# Patient Record
Sex: MALE | URBAN - METROPOLITAN AREA
[De-identification: names, ages, dates, MRNs, and addresses within clinical notes are randomized per-mention and may not be internally consistent; named-entity substitution may affect disease eponyms.]

---

## 2022-08-08 ENCOUNTER — OFFICE VISIT (OUTPATIENT)
Dept: URBAN - METROPOLITAN AREA CLINIC 74 | Facility: CLINIC | Age: 32
End: 2022-08-08

## 2025-02-03 ENCOUNTER — OFFICE VISIT (OUTPATIENT)
Dept: URBAN - METROPOLITAN AREA CLINIC 2 | Facility: CLINIC | Age: 35
End: 2025-02-03

## 2025-02-10 ENCOUNTER — OFFICE VISIT (OUTPATIENT)
Dept: URBAN - METROPOLITAN AREA CLINIC 2 | Facility: CLINIC | Age: 35
End: 2025-02-10
Payer: COMMERCIAL

## 2025-02-10 ENCOUNTER — DASHBOARD ENCOUNTERS (OUTPATIENT)
Age: 35
End: 2025-02-10

## 2025-02-10 VITALS
SYSTOLIC BLOOD PRESSURE: 116 MMHG | WEIGHT: 190.2 LBS | DIASTOLIC BLOOD PRESSURE: 85 MMHG | HEART RATE: 87 BPM | BODY MASS INDEX: 25.76 KG/M2 | TEMPERATURE: 98.1 F | HEIGHT: 72 IN

## 2025-02-10 DIAGNOSIS — K51.00 ULCERATIVE PANCOLITIS: ICD-10-CM

## 2025-02-10 PROBLEM — 444548001: Status: ACTIVE | Noted: 2025-02-10

## 2025-02-10 PROCEDURE — 99203 OFFICE O/P NEW LOW 30 MIN: CPT | Performed by: NURSE PRACTITIONER

## 2025-02-10 RX ORDER — MESALAMINE 1000 MG/1
1 SUPPOSITORY AT BEDTIME SUPPOSITORY RECTAL ONCE A DAY
Status: ACTIVE | COMMUNITY

## 2025-02-10 RX ORDER — MESALAMINE 1.2 G/1
2 TABLETS WITH A MEAL TABLET, DELAYED RELEASE ORAL ONCE A DAY
Status: ACTIVE | COMMUNITY

## 2025-02-10 NOTE — HPI-TODAY'S VISIT:
34-year-old male seen today to establish care for history of ulcerative colitis.  Patient was diagnosed in 2022 with pancolitis.  He was treated initially with mesalamine oral and suppositories.  Mesalamine has been cost prohibitive so once his symptoms improved, he decreased his mesalamine to 2.4 g daily in April. Patient had a flare in July with increased rectal bleeding.  He went to ER and CT showed inflammation on the right colon.  Since then he is taking mesalamine 4.8gm and mesalamine suppositories. he denies abdominal pain. He had rectal bleeding over the weekend-prior to that was last July. he denies urgency. some increase in frequency. Father also has UC-does not know what he takes.

## 2025-02-27 LAB
ALBUMIN/GLOBULIN RATIO: 1.4
ALBUMIN: 4.2
ALKALINE PHOSPHATASE: 76
ALT: 42
AST: 26
BILIRUBIN, TOTAL: 0.7
BUN/CREATININE RATIO: (no result)
CALCIUM: 9.4
CARBON DIOXIDE: 26
CHLORIDE: 103
CREATININE: 0.97
GLOBULIN: 3
GLUCOSE: 97
POTASSIUM: 4.4
PROTEIN, TOTAL: 7.2
SODIUM: 138
UREA NITROGEN (BUN): 16

## 2025-02-28 LAB
C-REACTIVE PROTEIN, QUANT: <3
HEMATOCRIT: 46
HEMOGLOBIN: 15.6
MCH: 29.6
MCHC: 33.9
MCV: 87.3
MPV: 10.1
PLATELET COUNT: 198
RDW: 14.4
RED BLOOD CELL COUNT: 5.27
WHITE BLOOD CELL COUNT: 4.6

## 2025-03-10 ENCOUNTER — CLAIMS CREATED FROM THE CLAIM WINDOW (OUTPATIENT)
Dept: URBAN - METROPOLITAN AREA SURGERY CENTER 19 | Facility: SURGERY CENTER | Age: 35
End: 2025-03-10
Payer: COMMERCIAL

## 2025-03-10 ENCOUNTER — CLAIMS CREATED FROM THE CLAIM WINDOW (OUTPATIENT)
Dept: URBAN - METROPOLITAN AREA CLINIC 4 | Facility: CLINIC | Age: 35
End: 2025-03-10
Payer: COMMERCIAL

## 2025-03-10 DIAGNOSIS — Z86.0100 PERSONAL HISTORY OF COLONIC POLYPS: ICD-10-CM

## 2025-03-10 DIAGNOSIS — K31.89 OTHER DISEASES OF STOMACH AND DUODENUM: ICD-10-CM

## 2025-03-10 DIAGNOSIS — K51.00 ULCERATIVE PANCOLITIS: ICD-10-CM

## 2025-03-10 DIAGNOSIS — Z09 ENCNTR FOR F/U EXAM AFT TRTMT FOR COND OTH THAN MALIG NEOPLM: ICD-10-CM

## 2025-03-10 DIAGNOSIS — K63.89 OTHER SPECIFIED DISEASES OF INTESTINE: ICD-10-CM

## 2025-03-10 DIAGNOSIS — Z86.0100 PERSONAL HISTORY OF COLON POLYPS, UNSPECIFIED: ICD-10-CM

## 2025-03-10 DIAGNOSIS — K51.80 OTHER ULCERATIVE COLITIS WITHOUT COMPLICATIONS: ICD-10-CM

## 2025-03-10 DIAGNOSIS — K51.80 OTHER ULCERATIVE COLITIS: ICD-10-CM

## 2025-03-10 PROCEDURE — 00812 ANES LWR INTST SCR COLSC: CPT | Performed by: ANESTHESIOLOGY

## 2025-03-10 PROCEDURE — 45380 COLONOSCOPY AND BIOPSY: CPT | Performed by: INTERNAL MEDICINE

## 2025-03-10 PROCEDURE — 88305 TISSUE EXAM BY PATHOLOGIST: CPT | Performed by: PATHOLOGY

## 2025-03-10 RX ORDER — MESALAMINE 1000 MG/1
1 SUPPOSITORY AT BEDTIME SUPPOSITORY RECTAL ONCE A DAY
Status: ACTIVE | COMMUNITY

## 2025-03-10 RX ORDER — MESALAMINE 1.2 G/1
2 TABLETS WITH A MEAL TABLET, DELAYED RELEASE ORAL ONCE A DAY
Status: ACTIVE | COMMUNITY

## 2025-03-31 ENCOUNTER — OFFICE VISIT (OUTPATIENT)
Dept: URBAN - METROPOLITAN AREA CLINIC 2 | Facility: CLINIC | Age: 35
End: 2025-03-31
Payer: COMMERCIAL

## 2025-03-31 DIAGNOSIS — K51.80 OTHER ULCERATIVE COLITIS WITHOUT COMPLICATION: ICD-10-CM

## 2025-03-31 PROBLEM — 64766004: Status: ACTIVE | Noted: 2025-03-31

## 2025-03-31 PROCEDURE — 99213 OFFICE O/P EST LOW 20 MIN: CPT | Performed by: NURSE PRACTITIONER

## 2025-03-31 RX ORDER — MESALAMINE 1000 MG/1
1 SUPPOSITORY AT BEDTIME SUPPOSITORY RECTAL ONCE A DAY
Status: ACTIVE | COMMUNITY

## 2025-03-31 RX ORDER — SULFASALAZINE 500 MG/1
1 TABLET TABLET ORAL TWICE A DAY
Qty: 60 TABLET | Refills: 5 | OUTPATIENT
Start: 2025-03-31 | End: 2025-09-27

## 2025-03-31 RX ORDER — MESALAMINE 1.2 G/1
2 TABLETS WITH A MEAL TABLET, DELAYED RELEASE ORAL ONCE A DAY
Status: ACTIVE | COMMUNITY

## 2025-03-31 RX ORDER — ROSUVASTATIN CALCIUM 20 MG/1
TAKE 1 TABLET BY MOUTH ONE TIME DAILY TABLET ORAL
Qty: 30 UNSPECIFIED | Refills: 0 | Status: ACTIVE | COMMUNITY

## 2025-03-31 NOTE — HPI-TODAY'S VISIT:
34-year-old male seen today in follow-up after a colonoscopy to evaluate ulcerative colitis.  In [facility] he was diagnosed in 2022 with pancolitis.  He had been treated with mesalamine oral and suppositories.  However, this medication was cost prohibitive so he was not taking as recommended and had been having increasing flares. however, he did note that suppository helped symptoms better than oral. He never has abdominal pain. He may have loose stools or occasionally have hematochezia. Colonoscopy was notable for normal mucosa but pathology indicated patchy chronic active colitis consistent with partially treated ulcerative colitis in the right colon. He returns today and feels well.

## 2025-05-22 ENCOUNTER — APPOINTMENT (OUTPATIENT)
Age: 35
Setting detail: DERMATOLOGY
End: 2025-05-22

## 2025-05-22 DIAGNOSIS — D18.0 HEMANGIOMA: ICD-10-CM

## 2025-05-22 DIAGNOSIS — D22 MELANOCYTIC NEVI: ICD-10-CM

## 2025-05-22 DIAGNOSIS — D485 NEOPLASM OF UNCERTAIN BEHAVIOR OF SKIN: ICD-10-CM

## 2025-05-22 DIAGNOSIS — L30.4 ERYTHEMA INTERTRIGO: ICD-10-CM

## 2025-05-22 DIAGNOSIS — L81.4 OTHER MELANIN HYPERPIGMENTATION: ICD-10-CM

## 2025-05-22 PROBLEM — L30.9 DERMATITIS, UNSPECIFIED: Status: ACTIVE | Noted: 2025-05-22

## 2025-05-22 PROBLEM — D22.5 MELANOCYTIC NEVI OF TRUNK: Status: ACTIVE | Noted: 2025-05-22

## 2025-05-22 PROBLEM — D48.5 NEOPLASM OF UNCERTAIN BEHAVIOR OF SKIN: Status: ACTIVE | Noted: 2025-05-22

## 2025-05-22 PROBLEM — D18.01 HEMANGIOMA OF SKIN AND SUBCUTANEOUS TISSUE: Status: ACTIVE | Noted: 2025-05-22

## 2025-05-22 PROCEDURE — ? OBSERVATION

## 2025-05-22 PROCEDURE — 99203 OFFICE O/P NEW LOW 30 MIN: CPT | Mod: 25

## 2025-05-22 PROCEDURE — ? PRESCRIPTION

## 2025-05-22 PROCEDURE — ? COUNSELING

## 2025-05-22 PROCEDURE — 11103 TANGNTL BX SKIN EA SEP/ADDL: CPT

## 2025-05-22 PROCEDURE — ? BIOPSY BY SHAVE METHOD

## 2025-05-22 PROCEDURE — 11102 TANGNTL BX SKIN SINGLE LES: CPT

## 2025-05-22 RX ORDER — CLINDAMYCIN PHOSPHATE 10 MG/ML
LOTION TOPICAL
Qty: 60 | Refills: 3 | Status: ERX | COMMUNITY
Start: 2025-05-22

## 2025-05-22 RX ORDER — NYSTATIN OINTMENT 100000 [USP'U]/G
OINTMENT TOPICAL
Qty: 30 | Refills: 4 | Status: ERX | COMMUNITY
Start: 2025-05-22

## 2025-05-22 RX ADMIN — CLINDAMYCIN PHOSPHATE: 10 LOTION TOPICAL at 00:00

## 2025-05-22 RX ADMIN — NYSTATIN OINTMENT: 100000 OINTMENT TOPICAL at 00:00

## 2025-05-22 ASSESSMENT — LOCATION DETAILED DESCRIPTION DERM
LOCATION DETAILED: LEFT LATERAL INFERIOR CHEST
LOCATION DETAILED: PERIUMBILICAL SKIN
LOCATION DETAILED: LEFT INGUINAL CREASE
LOCATION DETAILED: EPIGASTRIC SKIN
LOCATION DETAILED: RIGHT LATERAL INFERIOR CHEST
LOCATION DETAILED: RIGHT LATERAL BUTTOCK
LOCATION DETAILED: SUPERIOR THORACIC SPINE
LOCATION DETAILED: INFERIOR MID FOREHEAD

## 2025-05-22 ASSESSMENT — LOCATION SIMPLE DESCRIPTION DERM
LOCATION SIMPLE: UPPER BACK
LOCATION SIMPLE: RIGHT BUTTOCK
LOCATION SIMPLE: CHEST
LOCATION SIMPLE: GROIN
LOCATION SIMPLE: INFERIOR FOREHEAD
LOCATION SIMPLE: ABDOMEN

## 2025-05-22 ASSESSMENT — SEVERITY ASSESSMENT: SEVERITY: MILD TO MODERATE

## 2025-05-22 ASSESSMENT — LOCATION ZONE DERM
LOCATION ZONE: FACE
LOCATION ZONE: TRUNK

## 2025-05-22 NOTE — PROCEDURE: BIOPSY BY SHAVE METHOD
Detail Level: Detailed
Depth Of Biopsy: dermis
Was A Bandage Applied: Yes
Size Of Lesion In Cm: 0.5
X Size Of Lesion In Cm: 0
Biopsy Type: H and E
Biopsy Method: Dermablade
Anesthesia Type: 1% lidocaine with epinephrine
Hemostasis: Aluminum Chloride and Thermocautery
Wound Care: Mupirocin
Dressing: bandage
Destruction After The Procedure: No
Type Of Destruction Used: Curettage
Curettage Text: The wound bed was treated with curettage after the biopsy was performed.
Cryotherapy Text: The wound bed was treated with cryotherapy after the biopsy was performed.
Electrodesiccation Text: The wound bed was treated with electrodesiccation after the biopsy was performed.
Electrodesiccation And Curettage Text: The wound bed was treated with electrodesiccation and curettage after the biopsy was performed.
Silver Nitrate Text: The wound bed was treated with silver nitrate after the biopsy was performed.
Lab: [id number]
Lab Facility: [id number]
Medical Necessity Information: It is in your best interest to select a reason for this procedure from the list below. All of these items fulfill various CMS LCD requirements except the new and changing color options.
Consent: Written consent was obtained and risks were reviewed including but not limited to scarring, infection, bleeding, scabbing, incomplete removal, nerve damage and allergy to anesthesia.
Post-Care Instructions: I reviewed with the patient in detail post-care instructions. Patient is to keep the biopsy site dry overnight, and then apply bacitracin twice daily until healed. Patient may apply hydrogen peroxide soaks to remove any crusting.
Notification Instructions: Patient will be notified of biopsy results. However, patient instructed to call the office if not contacted within 2 weeks.
Billing Type: Third-Party Bill
Information: Selecting Yes will display possible errors in your note based on the variables you have selected. This validation is only offered as a suggestion for you. PLEASE NOTE THAT THE VALIDATION TEXT WILL BE REMOVED WHEN YOU FINALIZE YOUR NOTE. IF YOU WANT TO FAX A PRELIMINARY NOTE YOU WILL NEED TO TOGGLE THIS TO 'NO' IF YOU DO NOT WANT IT IN YOUR FAXED NOTE.
Size Of Lesion In Cm: 0.6

## 2025-06-30 ENCOUNTER — OFFICE VISIT (OUTPATIENT)
Dept: URBAN - METROPOLITAN AREA CLINIC 2 | Facility: CLINIC | Age: 35
End: 2025-06-30

## 2025-07-17 ENCOUNTER — OFFICE VISIT (OUTPATIENT)
Dept: URBAN - METROPOLITAN AREA CLINIC 2 | Facility: CLINIC | Age: 35
End: 2025-07-17
Payer: COMMERCIAL

## 2025-07-17 DIAGNOSIS — K64.8 INTERNAL HEMORRHOIDS: ICD-10-CM

## 2025-07-17 DIAGNOSIS — K51.00 ULCERATIVE PANCOLITIS: ICD-10-CM

## 2025-07-17 PROCEDURE — 99214 OFFICE O/P EST MOD 30 MIN: CPT | Performed by: NURSE PRACTITIONER

## 2025-07-17 RX ORDER — ROSUVASTATIN CALCIUM 20 MG/1
TAKE 1 TABLET BY MOUTH ONE TIME DAILY TABLET ORAL
Qty: 30 UNSPECIFIED | Refills: 0 | Status: ACTIVE | COMMUNITY

## 2025-07-17 RX ORDER — SULFASALAZINE 500 MG/1
1 TABLET TABLET ORAL TWICE A DAY
Qty: 60 TABLET | Refills: 5 | Status: ACTIVE | COMMUNITY
Start: 2025-03-31 | End: 2025-09-27

## 2025-07-17 RX ORDER — MESALAMINE 1000 MG/1
1 SUPPOSITORY AT BEDTIME SUPPOSITORY RECTAL ONCE A DAY
Status: ON HOLD | COMMUNITY

## 2025-07-17 RX ORDER — HYDROCORTISONE ACETATE 25 MG/1
1 SUPPOSITORY SUPPOSITORY RECTAL ONCE A DAY
Qty: 14 SUPPOSITORY | Refills: 0 | OUTPATIENT
Start: 2025-07-17 | End: 2025-07-31

## 2025-07-17 RX ORDER — MESALAMINE 1.2 G/1
2 TABLETS WITH A MEAL TABLET, DELAYED RELEASE ORAL ONCE A DAY
Status: DISCONTINUED | COMMUNITY

## 2025-07-17 NOTE — HPI-TODAY'S VISIT:
Very pleasant 34-year-old male with history of ulcerative colitis seen today in interval follow-up.  He had been using mesalamine oral and suppositories.  However, this was cost prohibitive and he was not taking it as recommended and having increasing flares.  The colonoscopy noted patchy chronic active colitis the right colon.  Patient was last seen in March and we started sulfasalazine.  Patient is seen in follow-up today. UC symptoms have been well-controlled since starting sulfasalazine.  Bowel movements are more formed.  He does have hemorrhoids that he occasionally has noted blood when wiping.  He denies extraintestinal manifestations.

## 2025-07-25 LAB
ALBUMIN/GLOBULIN RATIO: 1.6
ALBUMIN: 4.5
ALKALINE PHOSPHATASE: 61
ALT: 43
AST: 28
BILIRUBIN, TOTAL: 0.9
BUN/CREATININE RATIO: (no result)
CALCIUM: 9.3
CARBON DIOXIDE: 24
CHLORIDE: 104
CREATININE: 1
GLOBULIN: 2.8
GLUCOSE: 103
POTASSIUM: 3.6
PROTEIN, TOTAL: 7.3
SODIUM: 139
UREA NITROGEN (BUN): 11

## 2025-07-28 LAB
C-REACTIVE PROTEIN, QUANT: <3
CALPROTECTIN, FECAL: (no result)
HEMATOCRIT: 46.6
HEMOGLOBIN: 15.3
MCH: 29.5
MCHC: 32.8
MCV: 90
MPV: 10.2
PLATELET COUNT: 176
RDW: 14.1
RED BLOOD CELL COUNT: 5.18
WHITE BLOOD CELL COUNT: 4.6

## 2025-08-17 ENCOUNTER — LAB OUTSIDE AN ENCOUNTER (OUTPATIENT)
Dept: URBAN - METROPOLITAN AREA CLINIC 80 | Facility: CLINIC | Age: 35
End: 2025-08-17

## 2025-08-21 LAB — CALPROTECTIN, FECAL: 117
